# Patient Record
Sex: FEMALE | Race: WHITE | NOT HISPANIC OR LATINO | URBAN - METROPOLITAN AREA
[De-identification: names, ages, dates, MRNs, and addresses within clinical notes are randomized per-mention and may not be internally consistent; named-entity substitution may affect disease eponyms.]

---

## 2020-03-09 LAB
ABORH_EXT (HISTORICAL CONVERSION): NORMAL
ANTIBODY_EXT (HISTORICAL CONVERSION): NEGATIVE
HBSAG_EXT (HISTORICAL CONVERSION): NORMAL
HGB_EXT (HISTORICAL CONVERSION): 14.1
HIV_EXT: NORMAL
PLT_EXT - HISTORICAL: 332
RPR - HISTORICAL: NORMAL
RUBELLA_EXT (HISTORICAL CONVERSION): NORMAL

## 2020-08-05 ENCOUNTER — HOSPITAL ENCOUNTER (OUTPATIENT)
Dept: MEDSURG UNIT | Facility: CLINIC | Age: 30
Discharge: HOME | End: 2020-08-05
Attending: OBSTETRICS & GYNECOLOGY | Admitting: OBSTETRICS & GYNECOLOGY

## 2020-08-05 ENCOUNTER — RECORDS - HEALTHEAST (OUTPATIENT)
Dept: ADMINISTRATIVE | Facility: OTHER | Age: 30
End: 2020-08-05

## 2020-08-05 LAB
ABO/RH(D): NORMAL
ALBUMIN UR-MCNC: NEGATIVE MG/DL
ALT SERPL W P-5'-P-CCNC: 19 U/L (ref 0–45)
ANTIBODY SCREEN: NEGATIVE
APTT PPP: 26 SECONDS (ref 24–37)
AST SERPL W P-5'-P-CCNC: 25 U/L (ref 0–40)
CREAT SERPL-MCNC: 0.7 MG/DL (ref 0.6–1.1)
CREAT UR-MCNC: 38.4 MG/DL
ERYTHROCYTE [DISTWIDTH] IN BLOOD BY AUTOMATED COUNT: 13.1 % (ref 11–14.5)
GFR SERPL CREATININE-BSD FRML MDRD: >60 ML/MIN/1.73M2
GLUCOSE UR STRIP-MCNC: NEGATIVE MG/DL
HCT VFR BLD AUTO: 33.8 % (ref 35–47)
HGB BLD-MCNC: 10.8 G/DL (ref 12–16)
INR PPP: 0.94 (ref 0.9–1.1)
KETONES UR STRIP-MCNC: NEGATIVE MG/DL
MCH RBC QN AUTO: 28.3 PG (ref 27–34)
MCHC RBC AUTO-ENTMCNC: 32 G/DL (ref 32–36)
MCV RBC AUTO: 89 FL (ref 80–100)
PLATELET # BLD AUTO: 261 THOU/UL (ref 140–440)
PMV BLD AUTO: 11.4 FL (ref 8.5–12.5)
PROTEIN, RANDOM URINE - HISTORICAL: 10 MG/DL
PROTEIN/CREAT RATIO, RANDOM UR: 0.26
RBC # BLD AUTO: 3.81 MILL/UL (ref 3.8–5.4)
URATE SERPL-MCNC: 4.5 MG/DL (ref 2–7.5)
WBC: 10.1 THOU/UL (ref 4–11)

## 2020-08-05 ASSESSMENT — MIFFLIN-ST. JEOR: SCORE: 1565.91

## 2020-08-17 ASSESSMENT — MIFFLIN-ST. JEOR: SCORE: 1597.66

## 2020-08-18 ENCOUNTER — COMMUNICATION - HEALTHEAST (OUTPATIENT)
Dept: SCHEDULING | Facility: CLINIC | Age: 30
End: 2020-08-18

## 2020-08-19 ASSESSMENT — MIFFLIN-ST. JEOR: SCORE: 1624.88

## 2020-08-20 ENCOUNTER — ANESTHESIA - HEALTHEAST (OUTPATIENT)
Dept: OBGYN | Facility: CLINIC | Age: 30
End: 2020-08-20

## 2020-08-20 ENCOUNTER — SURGERY - HEALTHEAST (OUTPATIENT)
Dept: OBGYN | Facility: CLINIC | Age: 30
End: 2020-08-20

## 2020-08-20 ASSESSMENT — MIFFLIN-ST. JEOR: SCORE: 1632.13

## 2020-08-24 ENCOUNTER — HOME CARE/HOSPICE - HEALTHEAST (OUTPATIENT)
Dept: HOME HEALTH SERVICES | Facility: HOME HEALTH | Age: 30
End: 2020-08-24

## 2020-08-24 ENCOUNTER — COMMUNICATION - HEALTHEAST (OUTPATIENT)
Dept: OBGYN | Facility: CLINIC | Age: 30
End: 2020-08-24

## 2021-06-04 VITALS — WEIGHT: 199 LBS | BODY MASS INDEX: 36.62 KG/M2 | HEIGHT: 62 IN

## 2021-06-04 VITALS — WEIGHT: 213.6 LBS | HEIGHT: 62 IN | BODY MASS INDEX: 39.31 KG/M2

## 2021-06-10 NOTE — PROGRESS NOTES
D:  Yola is here from the clinic for serial blood pressures, HELLP labs and fetal heart monitoring.  A:  Fetal heart monitor and tocco applied, oral hydration offered, triage assessment completed, labs ordered.  Questions answered.  R:  Awaiting lab results will notify Dr. Rogers.       Lab results obtained.  Dr. Engebretson call with values, TORB to discharge to home obtained.

## 2021-06-10 NOTE — TELEPHONE ENCOUNTER
OB Follow Up Phone Call    Patient: Yola Farr  : 1990  MRN: [id number]     Location [facility]  Provider Magy Freeman MD      :   N/A    Language:   English    Discharge Follow-Up:  Follow-Up call by Outreach nurse: Message left for patient    Type of Delivery:      Feeding Method:  Formula    Comments:   Left message with [facility] Outreach phone number for patient to call back if desired. Reminded patient to schedule postpartum follow-up appointment as directed by PCP at discharge.  Encouraged patient to call clinic/PCP with questions or concerns.    Completed by:   Magy Pichardo RN      Patient: Yola Farr  : 1990  MRN: [id number]

## 2021-06-10 NOTE — ANESTHESIA PROCEDURE NOTES
Spinal Block    Patient location during procedure: OR  Start time: 8/20/2020 6:15 PM  End time: 8/20/2020 6:18 PM  Reason for block: primary anesthetic    Staffing:  Performing  Anesthesiologist: Damien Call IV, MD    Preanesthetic Checklist  Completed: patient identified, risks, benefits, and alternatives discussed, timeout performed, consent obtained, at patient's request, airway assessed, oxygen available, suction available, emergency drugs available and hand hygiene performed  Spinal Block  Patient position: sitting  Prep: ChloraPrep  Patient monitoring: heart rate, cardiac monitor, continuous pulse ox and blood pressure  Approach: midline  Location: L3-4  Injection technique: single-shot  Needle type: pencil-tip   Needle gauge: 24 G

## 2021-06-10 NOTE — ANESTHESIA CARE TRANSFER NOTE
Last vitals:   Vitals:    08/20/20 1910   BP: 145/79   Pulse: 92   Resp: 16   Temp: 36.7  C (98.1  F)   SpO2: 99%     Patient's level of consciousness is awake  Spontaneous respirations: yes  Maintains airway independently: yes  Dentition unchanged: yes  Oropharynx: oropharynx clear of all foreign objects    QCDR Measures:  ASA# 20 - Surgical Safety Checklist: WHO surgical safety checklist completed prior to induction    PQRS# 430 - Adult PONV Prevention: 4558F - Pt received => 2 anti-emetic agents (different classes) preop & intraop  ASA# 8 - Peds PONV Prevention: NA - Not pediatric patient, not GA or 2 or more risk factors NOT present  PQRS# 424 - Nivia-op Temp Management: 4559F - At least one body temp DOCUMENTED => 35.5C or 95.9F within required timeframe  PQRS# 426 - PACU Transfer Protocol: - Transfer of care checklist used  ASA# 14 - Acute Post-op Pain: ASA14B - Patient did NOT experience pain >= 7 out of 10

## 2021-06-10 NOTE — ANESTHESIA PREPROCEDURE EVALUATION
Anesthesia Evaluation      Patient summary reviewed   No history of anesthetic complications     Airway   Mallampati: II  Neck ROM: full   Pulmonary - negative ROS and normal exam                          Cardiovascular - normal exam  (+) hypertension, ,      Neuro/Psych - negative ROS     Endo/Other    (+) pregnant     GI/Hepatic/Renal - negative ROS           Dental - normal exam   (+) caps                       Anesthesia Plan  Planned anesthetic: spinal and peripheral nerve block  Spinal with intrathecal Duramorph and bilateral TAP blocks for post op pain control per surgeon request.  ASA 2     Anesthetic plan and risks discussed with: patient and spouse    Post-op plan: routine recovery

## 2021-06-10 NOTE — ANESTHESIA PROCEDURE NOTES
Peripheral Block    Patient location during procedure: OR  Start time: 8/20/2020 7:00 PM  End time: 8/20/2020 7:05 PM  post-op analgesia per surgeon order as noted in medical record  Staffing:  Performing  Anesthesiologist: Damien Call IV, MD  Preanesthetic Checklist  Completed: patient identified, site marked, risks, benefits, and alternatives discussed, timeout performed, consent obtained, at patient's request, airway assessed, oxygen available, suction available, emergency drugs available and hand hygiene performed  Peripheral Block  Block type: other, TAP  Prep: ChloraPrep  Patient position: supine  Patient monitoring: cardiac monitor, continuous pulse oximetry, heart rate and blood pressure  Laterality: bilateral, same technique used bilaterally  Injection technique: ultrasound guided    Ultrasound used to visualize needle placement in proximity to nerve being blocked: yes   US used to visualize anesthetic spread  Visualized anatomic structures normal  No Pathological Findings  Permanent ultrasound image captured for medical record  Sterile gel and probe cover used for ultrasound.  Needle  Needle type: echogenic   Needle gauge: 20G  Needle length: 6 in  no peripheral nerve catheter placed

## 2021-06-10 NOTE — ANESTHESIA POSTPROCEDURE EVALUATION
Patient: Yola Farr  Procedure(s):   SECTION  Anesthesia type: spinal    Patient location: PACU  Last vitals:   Vitals Value Taken Time   /78 2020  7:55 PM   Temp 36.7  C (98.1  F) 2020  7:10 PM   Pulse 82 2020  7:58 PM   Resp 16 2020  7:40 PM   SpO2 95 % 2020  7:58 PM   Vitals shown include unvalidated device data.  Post vital signs: stable  Level of consciousness: awake and responds to simple questions  Post-anesthesia pain: pain controlled  Post-anesthesia nausea and vomiting: no  Pulmonary: unassisted, return to baseline  Cardiovascular: stable and blood pressure at baseline  Hydration: adequate  Anesthetic events: no    QCDR Measures:  ASA# 11 - Nivia-op Cardiac Arrest: ASA11B - Patient did NOT experience unanticipated cardiac arrest  ASA# 12 - Nivia-op Mortality Rate: ASA12B - Patient did NOT die  ASA# 13 - PACU Re-Intubation Rate: ASA13B - Patient did NOT require a new airway mgmt  ASA# 10 - Composite Anes Safety: ASA10A - No serious adverse event    Additional Notes:

## 2021-06-16 PROBLEM — O14.90 PRE-ECLAMPSIA, ANTEPARTUM: Status: ACTIVE | Noted: 2020-08-17

## 2021-06-16 PROBLEM — Z98.891 STATUS POST CESAREAN DELIVERY: Status: ACTIVE | Noted: 2020-08-20
